# Patient Record
Sex: MALE | Race: WHITE | Employment: FULL TIME | URBAN - METROPOLITAN AREA
[De-identification: names, ages, dates, MRNs, and addresses within clinical notes are randomized per-mention and may not be internally consistent; named-entity substitution may affect disease eponyms.]

---

## 2018-05-20 ENCOUNTER — OFFICE VISIT (OUTPATIENT)
Dept: URGENT CARE | Facility: PHYSICIAN GROUP | Age: 23
End: 2018-05-20
Payer: COMMERCIAL

## 2018-05-20 ENCOUNTER — HOSPITAL ENCOUNTER (OUTPATIENT)
Facility: MEDICAL CENTER | Age: 23
End: 2018-05-20
Attending: EMERGENCY MEDICINE
Payer: COMMERCIAL

## 2018-05-20 VITALS
BODY MASS INDEX: 36.53 KG/M2 | WEIGHT: 300 LBS | HEART RATE: 56 BPM | RESPIRATION RATE: 16 BRPM | TEMPERATURE: 97.8 F | DIASTOLIC BLOOD PRESSURE: 60 MMHG | SYSTOLIC BLOOD PRESSURE: 128 MMHG | HEIGHT: 76 IN | OXYGEN SATURATION: 97 %

## 2018-05-20 DIAGNOSIS — R21 RASH: ICD-10-CM

## 2018-05-20 PROCEDURE — 99213 OFFICE O/P EST LOW 20 MIN: CPT | Performed by: EMERGENCY MEDICINE

## 2018-05-20 PROCEDURE — 87070 CULTURE OTHR SPECIMN AEROBIC: CPT

## 2018-05-20 PROCEDURE — 87205 SMEAR GRAM STAIN: CPT

## 2018-05-20 ASSESSMENT — ENCOUNTER SYMPTOMS
EYE REDNESS: 0
EYE DISCHARGE: 0
SENSORY CHANGE: 0
NAUSEA: 0
FEVER: 0
ABDOMINAL PAIN: 0
VOMITING: 0
NECK PAIN: 0

## 2018-05-20 NOTE — PROGRESS NOTES
"Subjective:      Rupert Norris is a 23 y.o. male who presents with Rash (rash on backof head)            HPI  Patient is a 23-year-old male currently in a rash on the back of his neck it has been there foir an unknown period of time, pt has been essentially living in a unclean enviorment for the past year..Patient recently returned and his mother had his head shaved it showing a large erythematous rash on the back of his scalp refer to the diagram    Allergies   Allergen Reactions   • Ceclor [Cefaclor] Unspecified     Made ear infection worse     Social History     Social History   • Marital status: Single     Spouse name: N/A   • Number of children: N/A   • Years of education: N/A     Occupational History   • Not on file.     Social History Main Topics   • Smoking status: Never Smoker   • Smokeless tobacco: Never Used   • Alcohol use No   • Drug use: No   • Sexual activity: Not Currently     Other Topics Concern   • Not on file     Social History Narrative   • No narrative on file   No past medical history on file.   No current outpatient prescriptions on file prior to visit.     No current facility-administered medications on file prior to visit.    No family history on file.  Review of Systems   Constitutional: Negative for fever.   Eyes: Negative for discharge and redness.   Gastrointestinal: Negative for abdominal pain, nausea and vomiting.   Musculoskeletal: Negative for neck pain.   Skin:        Patient has a large 10-12 cm erythematous rash over his occiput refer to the diagram. The rashes erythematous scaly no vesicles involving the hair follicles.   Neurological: Negative for sensory change.          Objective:     /60   Pulse (!) 56   Temp 36.6 °C (97.8 °F)   Resp 16   Ht 1.918 m (6' 3.5\")   Wt (!) 136.1 kg (300 lb)   SpO2 97%   BMI 37.00 kg/m²      Physical Exam   Constitutional: He appears well-developed and well-nourished. No distress.   Obese male in no acute distress weighing 300 " pounds.   HENT:   Head: Normocephalic and atraumatic.       Right Ear: External ear normal.   Left Ear: External ear normal.   Large erythematous scalp lesion over the occiput.   Eyes: Conjunctivae are normal.   Pulmonary/Chest: Effort normal and breath sounds normal.   Neurological: He is alert.   Skin: Skin is warm. There is erythema.   Psychiatric: He has a normal mood and affect.   Nursing note and vitals reviewed.            Culture from the patient's scalp obtained.  Assessment/Plan:     Diagnosis: Acute scalp rash .    .Patient is leaving for [place] in the next few days and cannot imagine getting him to see a dermatologist. Patient will be given a prescription for Bactroban and I will culture his scalp to see pain develops on culture.  Patient's mother wanted the patient to have a head to toe physical which I told her could refer him to a PCP,but that we did not perform these in the urgent care .    Patient will follow up with the dermatologist in [place] for scalp rash.

## 2018-05-21 LAB
GRAM STN SPEC: NORMAL
SIGNIFICANT IND 70042: NORMAL
SITE SITE: NORMAL
SOURCE SOURCE: NORMAL

## 2018-05-22 LAB
BACTERIA WND AEROBE CULT: NORMAL
GRAM STN SPEC: NORMAL
SIGNIFICANT IND 70042: NORMAL
SITE SITE: NORMAL
SOURCE SOURCE: NORMAL

## 2018-05-23 ENCOUNTER — TELEPHONE (OUTPATIENT)
Dept: URGENT CARE | Facility: PHYSICIAN GROUP | Age: 23
End: 2018-05-23

## 2018-05-23 NOTE — TELEPHONE ENCOUNTER
Unable to get patient on patient number is. Contacted second of November this is Dr. brandt try to get Rupert unable to after multiple phone calls convey his cultures on his scalp were negative obvious staph or strep infection.